# Patient Record
Sex: FEMALE | Race: BLACK OR AFRICAN AMERICAN
[De-identification: names, ages, dates, MRNs, and addresses within clinical notes are randomized per-mention and may not be internally consistent; named-entity substitution may affect disease eponyms.]

---
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
## 2020-01-24 ENCOUNTER — HOSPITAL ENCOUNTER (EMERGENCY)
Dept: HOSPITAL 62 - ER | Age: 20
Discharge: HOME | End: 2020-01-24
Payer: SELF-PAY

## 2020-01-24 VITALS — SYSTOLIC BLOOD PRESSURE: 120 MMHG | DIASTOLIC BLOOD PRESSURE: 74 MMHG

## 2020-01-24 DIAGNOSIS — S76.912A: Primary | ICD-10-CM

## 2020-01-24 DIAGNOSIS — M79.652: ICD-10-CM
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
## 2020-01-24 PROCEDURE — 99283 EMERGENCY DEPT VISIT LOW MDM: CPT

## 2020-01-24 NOTE — ER DOCUMENT REPORT
HPI





- HPI


Time Seen by Provider: 01/24/20 17:53


Onset/Duration: Persistent


Quality of pain: Achy


Pain Level: 5


Context: 





Patient presents complaining of left thigh pain for the past 3 days.  Patient 

states the day before she had been working out and since then has had soreness 

to the left anterior thigh muscle.  Patient complains of pain with ambulation.


Associated Symptoms: Other - Left thigh tenderness


Exacerbated by: Movement, Walking


Relieved by: Denies


Similar symptoms previously: No


Recently seen / treated by doctor: No





- ROS


ROS below otherwise negative: Yes


Systems Reviewed and Negative: Yes All other systems reviewed and negative





- CONSTITUTIONAL


Constitutional: DENIES: Fever





- NEURO


Neurology: DENIES: Weakness





- GASTROINTESTINAL


Gastrointestinal: DENIES: Nausea





- REPRODUCTIVE


Reproductive: DENIES: Pregnant:





- MUSCULOSKELETAL


Musculoskeletal: REPORTS: Extremity pain - left thigh.  DENIES: Swelling





- DERM


Skin Color: Normal


Skin Problems: None





Past Medical History





- General


Information source: Patient





- Social History


Smoking Status: Never Smoker


Chew tobacco use (# tins/day): No


Frequency of alcohol use: None


Drug Abuse: None


Occupation: Foodservice


Lives with: Family


Family History: Reviewed & Not Pertinent, Hypertension


Patient has suicidal ideation: No


Patient has homicidal ideation: No





- Medical History


Medical History: Negative


Renal/ Medical History: Reports: Hx Ovarian Cysts.  Denies: Hx Peritoneal 

Dialysis


Musculoskeletal Medical History: Reports Hx Musculoskeletal Trauma - upper right

leg


Skin Medical History: Reports Hx Eczema


Past Surgical History: Reports: Hx Gynecologic Surgery - Ovarian cyst removed





- Immunizations


Immunizations up to date: Yes


Hx Diphtheria, Pertussis, Tetanus Vaccination: Yes





Vertical Provider Document





- CONSTITUTIONAL


Agree With Documented VS: Yes


Exam Limitations: No Limitations


General Appearance: WD/WN, No Apparent Distress





- INFECTION CONTROL


TRAVEL OUTSIDE OF THE [place] IN LAST 30 DAYS: No





- HEENT


HEENT: Atraumatic, Normocephalic





- NECK


Neck: Normal Inspection, Supple.  negative: Lymphadenopathy-Left, 

Lymphadenopathy-Right





- RESPIRATORY


Respiratory: Breath Sounds Normal, No Respiratory Distress





- CARDIOVASCULAR


Cardiovascular: Regular Rate, Regular Rhythm





- BACK


Back: Normal Inspection





- MUSCULOSKELETAL/EXTREMETIES


Musculoskeletal/Extremeties: MAEW, Tender - Left anterior thigh tenderness over 

the rectus for Graham, soft muscle compartments, no obvious edema, no 

ecchymosis.  No tenderness to the hip or knee joints, No Edema.  negative: 

Eccymosis





- NEURO


Level of Consciousness: Awake, Alert, Appropriate


Motor/Sensory: No Motor Deficit





- DERM


Integumentary: Warm, Dry, No Rash





Course





- Re-evaluation


Re-evalutation: 





01/24/20 17:59


Patient presents with likely muscle strain to the left anterior thigh.  Patient 

without any recent traumatic injury.  No concern for any fracture or compartment

syndrome at this time.  Good return precautions discussed with patient.





- Vital Signs


Vital signs: 


                                        











Temp Pulse Resp BP Pulse Ox


 


 98.6 F   96 H  16   120/74   91 L


 


 01/24/20 17:31  01/24/20 17:31  01/24/20 17:31  01/24/20 17:31  01/24/20 17:31














Discharge





- Discharge


Clinical Impression: 


 Muscle strain





Condition: Stable


Disposition: HOME, SELF-CARE


Instructions:  Muscle Relaxers ([facility]), Muscle Strain ([facility]), Warm Packs ([facility])


Additional Instructions: 


Return immediately for any new or worsening symptoms





Followup with your primary care provider, call tomorrow to make a followup 

appointment





Weightbearing as tolerated





Follow-up with orthopedics for any persistent pain or problems


Prescriptions: 


Cyclobenzaprine HCl [Flexeril 10 Mg Tablet] 10 mg PO TID #15 tablet


Lidocaine [Lidoderm 5% (700 mg) Transdermal Patch] 1 patch TP DAILY PRN #10 

adh..patch


 PRN Reason: 


Naproxen [Naprosyn 250 Nmg Tablet] 1 tab PO BID #14 tablet


Forms:  Return to Work


Referrals: 


JACK ALBARRAN MD [Primary Care Provider] - Follow up as needed

## 2020-04-03 ENCOUNTER — HOSPITAL ENCOUNTER (EMERGENCY)
Dept: HOSPITAL 62 - ER | Age: 20
LOS: 1 days | Discharge: HOME | End: 2020-04-04
Payer: SELF-PAY

## 2020-04-03 DIAGNOSIS — K21.9: ICD-10-CM

## 2020-04-03 DIAGNOSIS — R10.13: Primary | ICD-10-CM

## 2020-04-03 LAB
ALBUMIN SERPL-MCNC: 4.3 G/DL (ref 3.7–5.6)
ALP SERPL-CCNC: 73 U/L (ref 50–135)
ANION GAP SERPL CALC-SCNC: 10 MMOL/L (ref 5–19)
APPEARANCE UR: (no result)
APTT PPP: YELLOW S
AST SERPL-CCNC: 21 U/L (ref 5–30)
BILIRUB DIRECT SERPL-MCNC: 0 MG/DL (ref 0–0.4)
BILIRUB SERPL-MCNC: 0.3 MG/DL (ref 0.2–1.3)
BILIRUB UR QL STRIP: NEGATIVE
BUN SERPL-MCNC: 8 MG/DL (ref 7–20)
CALCIUM: 10.1 MG/DL (ref 8.4–10.2)
CHLORIDE SERPL-SCNC: 106 MMOL/L (ref 98–107)
CO2 SERPL-SCNC: 25 MMOL/L (ref 22–30)
GLUCOSE SERPL-MCNC: 76 MG/DL (ref 75–110)
GLUCOSE UR STRIP-MCNC: NEGATIVE MG/DL
KETONES UR STRIP-MCNC: NEGATIVE MG/DL
NITRITE UR QL STRIP: NEGATIVE
PH UR STRIP: 7 [PH] (ref 5–9)
POTASSIUM SERPL-SCNC: 4.9 MMOL/L (ref 3.6–5)
PROT SERPL-MCNC: 7.4 G/DL (ref 6.3–8.2)
PROT UR STRIP-MCNC: NEGATIVE MG/DL
SP GR UR STRIP: 1.02
UROBILINOGEN UR-MCNC: 2 MG/DL (ref ?–2)

## 2020-04-03 PROCEDURE — 80053 COMPREHEN METABOLIC PANEL: CPT

## 2020-04-03 PROCEDURE — 85025 COMPLETE CBC W/AUTO DIFF WBC: CPT

## 2020-04-03 PROCEDURE — 36415 COLL VENOUS BLD VENIPUNCTURE: CPT

## 2020-04-03 PROCEDURE — 81025 URINE PREGNANCY TEST: CPT

## 2020-04-03 PROCEDURE — 99284 EMERGENCY DEPT VISIT MOD MDM: CPT

## 2020-04-03 PROCEDURE — 81001 URINALYSIS AUTO W/SCOPE: CPT

## 2020-04-03 PROCEDURE — 83690 ASSAY OF LIPASE: CPT

## 2020-04-04 VITALS — SYSTOLIC BLOOD PRESSURE: 118 MMHG | DIASTOLIC BLOOD PRESSURE: 72 MMHG

## 2020-04-04 LAB
ADD MANUAL DIFF: NO
BASOPHILS # BLD AUTO: 0 10^3/UL (ref 0–0.2)
BASOPHILS NFR BLD AUTO: 0.2 % (ref 0–2)
EOSINOPHIL # BLD AUTO: 0.1 10^3/UL (ref 0–0.6)
EOSINOPHIL NFR BLD AUTO: 1.1 % (ref 0–6)
ERYTHROCYTE [DISTWIDTH] IN BLOOD BY AUTOMATED COUNT: 13.8 % (ref 11.5–14)
HCT VFR BLD CALC: 39.7 % (ref 36–47)
HGB BLD-MCNC: 13.5 G/DL (ref 12–15.5)
LYMPHOCYTES # BLD AUTO: 2.5 10^3/UL (ref 0.5–4.7)
LYMPHOCYTES NFR BLD AUTO: 43 % (ref 13–45)
MCH RBC QN AUTO: 28.2 PG (ref 27–33.4)
MCHC RBC AUTO-ENTMCNC: 34 G/DL (ref 32–36)
MCV RBC AUTO: 83 FL (ref 80–97)
MONOCYTES # BLD AUTO: 0.4 10^3/UL (ref 0.1–1.4)
MONOCYTES NFR BLD AUTO: 6.5 % (ref 3–13)
NEUTROPHILS # BLD AUTO: 2.8 10^3/UL (ref 1.7–8.2)
NEUTS SEG NFR BLD AUTO: 49.2 % (ref 42–78)
PLATELET # BLD: 305 10^3/UL (ref 150–450)
RBC # BLD AUTO: 4.79 10^6/UL (ref 3.72–5.28)
TOTAL CELLS COUNTED % (AUTO): 100 %
WBC # BLD AUTO: 5.8 10^3/UL (ref 4–10.5)

## 2020-06-12 ENCOUNTER — HOSPITAL ENCOUNTER (OUTPATIENT)
Dept: HOSPITAL 62 - RAD | Age: 20
End: 2020-06-12
Attending: PHYSICIAN ASSISTANT
Payer: MEDICAID

## 2020-06-12 DIAGNOSIS — M79.661: Primary | ICD-10-CM

## 2020-06-12 DIAGNOSIS — R22.41: ICD-10-CM

## 2020-06-12 NOTE — RADIOLOGY REPORT (SQ)
EXAM DESCRIPTION:  MRI RT LOWER EXTREMITY WITHOUT



IMAGES COMPLETED DATE/TIME:  6/12/2020 11:56 am



REASON FOR STUDY:  M79.661 PAIN IN RIGHT LOWER LEG M79.661  PAIN IN RIGHT LOWER LEG R22.41  LOCALIZED
 SWELLING, MASS AND LUMP, RIGHT LOWER LIMB  calf pain, swelling, tingling, numbness.  Pain in the ant
erior right lower leg.  Lost feeling in leg.  Marker was placed on the area of pain.



COMPARISON:  None.



TECHNIQUE:  Multiplanar imaging to include fat and fluid sensitive sequences.



RENAL FUNCTION:  NA



LIMITATIONS:  None.



FINDINGS:  There is no mass identified in the area of concern.  Underlying skin and subcutaneous soft
 tissue is normal.  Underlying muscle has normal signal and appearance.  Normal flow voids in the adj
acent vasculature.  There is normal bone marrow signal.  No knee joint effusion.  Normal appearance o
f the ligaments and tendons.



IMPRESSION:  No abnormality in the area of palpable concern.



TECHNICAL DOCUMENTATION:  JOB ID:  [id number]

 2011 [email or web address]- All Rights Reserved



Reading location - IP/workstation name: [email or web address]

## 2020-06-26 ENCOUNTER — HOSPITAL ENCOUNTER (OUTPATIENT)
Dept: HOSPITAL 62 - RAD | Age: 20
End: 2020-06-26
Attending: NURSE PRACTITIONER
Payer: MEDICAID

## 2020-06-26 DIAGNOSIS — R10.11: Primary | ICD-10-CM

## 2020-06-26 PROCEDURE — 76705 ECHO EXAM OF ABDOMEN: CPT

## 2020-06-26 NOTE — RADIOLOGY REPORT (SQ)
EXAM DESCRIPTION:  U/S ABDOMEN LIMITED W/O DOP



IMAGES COMPLETED DATE/TIME:  6/26/2020 8:25 am



REASON FOR STUDY:  RUQ PAIN R10.11  RIGHT UPPER QUADRANT PAIN



COMPARISON:  10/5/2015



TECHNIQUE:  Dynamic and static grayscale images acquired of the abdomen and recorded on PACS. Additio
nal selected color Doppler and spectral images recorded.



LIMITATIONS:  None.



FINDINGS:  PANCREAS: No masses.  Visualized pancreatic duct normal caliber.

LIVER: No focal lesions.  Normal size measuring 16.7 cm.  Increased hepatic echogenicity with decreas
ed visualization of the portal triads.

LIVER VASCULATURE: Normal directional flow of the main portal vein and hepatic veins.

GALLBLADDER: No stones. Normal wall thickness. No pericholecystic fluid.

ULTRASOUND-DETECTED STARKS'S SIGN: Negative.

INTRAHEPATIC DUCTS AND COMMON DUCT: CBD and intrahepatic ducts normal caliber. No filling defects.

INFERIOR VENA CAVA: Normal flow.

AORTA: No aneurysm.

RIGHT KIDNEY:  Normal size measuring 9.4 cm. Normal echogenicity. No solid or suspicious masses. No h
ydronephrosis. No calcifications.

PERITONEAL AND RIGHT PLEURAL SPACE: No ascites or effusions.

OTHER: No other significant findings.



IMPRESSION:  Increased hepatic echogenicity suggestive of hepatic steatosis.  Otherwise, unremarkable
 right upper quadrant ultrasound.



TECHNICAL DOCUMENTATION:  JOB ID:  [id number]

 2011 [employer]- All Rights Reserved



Reading location - IP/workstation name: [email or web address]

## 2020-12-20 ENCOUNTER — HOSPITAL ENCOUNTER (EMERGENCY)
Dept: HOSPITAL 62 - ER | Age: 20
Discharge: HOME | End: 2020-12-20
Payer: MEDICAID

## 2020-12-20 VITALS — DIASTOLIC BLOOD PRESSURE: 70 MMHG | SYSTOLIC BLOOD PRESSURE: 132 MMHG

## 2020-12-20 DIAGNOSIS — R07.9: ICD-10-CM

## 2020-12-20 DIAGNOSIS — F12.10: ICD-10-CM

## 2020-12-20 DIAGNOSIS — Z79.899: ICD-10-CM

## 2020-12-20 DIAGNOSIS — M54.12: Primary | ICD-10-CM

## 2020-12-20 DIAGNOSIS — M25.512: ICD-10-CM

## 2020-12-20 DIAGNOSIS — F17.200: ICD-10-CM

## 2020-12-20 DIAGNOSIS — M79.602: ICD-10-CM

## 2020-12-20 LAB
ADD MANUAL DIFF: NO
ALBUMIN SERPL-MCNC: 4.3 G/DL (ref 3.5–5)
ALP SERPL-CCNC: 78 U/L (ref 38–126)
ANION GAP SERPL CALC-SCNC: 7 MMOL/L (ref 5–19)
AST SERPL-CCNC: 21 U/L (ref 14–36)
BASOPHILS # BLD AUTO: 0 10^3/UL (ref 0–0.2)
BASOPHILS NFR BLD AUTO: 0.1 % (ref 0–2)
BILIRUB DIRECT SERPL-MCNC: 0.1 MG/DL (ref 0–0.4)
BILIRUB SERPL-MCNC: 0.5 MG/DL (ref 0.2–1.3)
BUN SERPL-MCNC: 12 MG/DL (ref 7–20)
CALCIUM: 9.9 MG/DL (ref 8.4–10.2)
CHLORIDE SERPL-SCNC: 102 MMOL/L (ref 98–107)
CO2 SERPL-SCNC: 28 MMOL/L (ref 22–30)
EOSINOPHIL # BLD AUTO: 0.1 10^3/UL (ref 0–0.6)
EOSINOPHIL NFR BLD AUTO: 1.2 % (ref 0–6)
ERYTHROCYTE [DISTWIDTH] IN BLOOD BY AUTOMATED COUNT: 13.5 % (ref 11.5–14)
GLUCOSE SERPL-MCNC: 85 MG/DL (ref 75–110)
HCT VFR BLD CALC: 41.3 % (ref 36–47)
HGB BLD-MCNC: 13.6 G/DL (ref 12–15.5)
LYMPHOCYTES # BLD AUTO: 2.6 10^3/UL (ref 0.5–4.7)
LYMPHOCYTES NFR BLD AUTO: 38.3 % (ref 13–45)
MCH RBC QN AUTO: 27.6 PG (ref 27–33.4)
MCHC RBC AUTO-ENTMCNC: 32.9 G/DL (ref 32–36)
MCV RBC AUTO: 84 FL (ref 80–97)
MONOCYTES # BLD AUTO: 0.4 10^3/UL (ref 0.1–1.4)
MONOCYTES NFR BLD AUTO: 5.8 % (ref 3–13)
NEUTROPHILS # BLD AUTO: 3.7 10^3/UL (ref 1.7–8.2)
NEUTS SEG NFR BLD AUTO: 54.6 % (ref 42–78)
PLATELET # BLD: 335 10^3/UL (ref 150–450)
POTASSIUM SERPL-SCNC: 4.3 MMOL/L (ref 3.6–5)
PROT SERPL-MCNC: 7.7 G/DL (ref 6.3–8.2)
RBC # BLD AUTO: 4.94 10^6/UL (ref 3.72–5.28)
TOTAL CELLS COUNTED % (AUTO): 100 %
WBC # BLD AUTO: 6.8 10^3/UL (ref 4–10.5)

## 2020-12-20 PROCEDURE — 36415 COLL VENOUS BLD VENIPUNCTURE: CPT

## 2020-12-20 PROCEDURE — 85025 COMPLETE CBC W/AUTO DIFF WBC: CPT

## 2020-12-20 PROCEDURE — 72050 X-RAY EXAM NECK SPINE 4/5VWS: CPT

## 2020-12-20 PROCEDURE — 84484 ASSAY OF TROPONIN QUANT: CPT

## 2020-12-20 PROCEDURE — 99285 EMERGENCY DEPT VISIT HI MDM: CPT

## 2020-12-20 PROCEDURE — 93010 ELECTROCARDIOGRAM REPORT: CPT

## 2020-12-20 PROCEDURE — 80053 COMPREHEN METABOLIC PANEL: CPT

## 2020-12-20 PROCEDURE — 93005 ELECTROCARDIOGRAM TRACING: CPT

## 2020-12-20 PROCEDURE — 71046 X-RAY EXAM CHEST 2 VIEWS: CPT

## 2020-12-20 NOTE — ER DOCUMENT REPORT
ED General





- General


Chief Complaint: Chest Pain


Stated Complaint: ARM PAIN


Time Seen by Provider: 20 16:41


Primary Care Provider: 


PRISCILLA BRENNER NP [Primary Care Provider] - Follow up as needed


TRAVEL OUTSIDE OF THE [place] IN LAST 30 DAYS: No





- HPI


Context: 





Time:





Chief Complaint: Chest and arm pain





[This is a 20-year-old left-hand-dominant female presenting to the emergency 

department complaining of left-sided chest, shoulder and left upper extremity 

pain that she says has been going on for about a week.  Patient denies trauma or

 strenuous activity.  Patient states that she has a constant sharp pain that is 

in her chest and in her shoulder and it radiates down into her left upper 

extremity all the way to her third through fifth fingers.  Patient states she h

as tried over-the-counter medications such as Tylenol and Motrin without relief 

of symptoms.         ]





History obtained from [patient]


Symptoms began:[1 week ago]


Onset: [Gradual]


Timing: [Gradual]


Quality: [Sharp]


Intensity: [3]





Location: [Left chest, left shoulder and left upper extremity]


Radiation: [Radiates from chest into left upper extremity]








Aggravating factors: Movement of left upper extremity


Relieving factors: [none]





[Denies] SOB


[Denies] nausea


[Denies] vomiting


[Denies] sweats


[Denies] fever


[Denies] cough


[Denies] calf or leg swelling or pain











- Related Data


Allergies/Adverse Reactions: 


                                        





No Known Allergies Allergy (Verified 20 16:43)


   











Past Medical History





- General


Information source: Patient





- Social History


Smoking Status: Current Some Day Smoker


Drug Abuse: Marijuana


Family History: Reviewed & Not Pertinent, Hypertension


Renal/ Medical History: Reports: Hx Ovarian Cysts.  Denies: Hx Peritoneal 

Dialysis


Musculoskeletal Medical History: Reports Hx Musculoskeletal Trauma - upper right

 leg


Skin Medical History: Reports Hx Eczema


Past Surgical History: Reports: Hx Gynecologic Surgery - Ovarian cyst removed





- Immunizations


Immunizations up to date: Yes


Hx Diphtheria, Pertussis, Tetanus Vaccination: Yes





Review of Systems





- Review of Systems


Notes: 





Review of systems as below unless otherwise stated in HPI.


CONSTITUTIONAL 


[No] fever, [No] chills.


EYES 


[No] eye pain. 


ENT 


[No] URI symptoms, [No] sore throat, [No] ear pain.


CARDIOVASCULAR 


Positive chest pain, [No] palpitations, [No] edema.


RESPIRATORY 


[No] Cough, [No] SOB, [No] wheezing. 


GASTROINTESTINAL 


[No] abdominal pain, [No] nausea, [No] Diarrhea, [No] Vomiting, [No] 

constipation, [No] melena, [No] rectal bleeding.


GENITOURINARY 


[No] dysuria, [No] urinary frequency, [No] hematuria, [No] urinary urgency, [No]

 vaginal discharge, [No] vaginal bleeding.


MUSCULOSKELETAL 


[No] Back pain.  Positive left upper extremity pain


SKIN 


[No] Rash.


NEUROLOGIC 


[No] Headache, [No] recent seizures, [No] paralysis,[ positive] parathesias in 

left third through fifth fingers


ENDOCRINE 


[No] polyuria. 


HEMO/LYMPATIC 


[No] easy brusing


PSYCHIATRIC 


[No] depression.














Physical Exam





- Vital signs


Vitals: 


                                        











Temp Pulse Resp BP Pulse Ox


 


 99.1 F   76   16   132/70 H  100 


 


 20 16:50  20 16:50  20 16:50  20 16:50  20 16:50














- Notes


Notes: 








CONSTITUTIONAL 


[Vital signs reviewed, Patient appears comfortable, Alert and oriented X 3, 

Normal stature.]


HEAD 


[Atraumatic, Normocephalic.]


EYES 


[Eyes are normal to inspection, No discharge from eyes, Extraocular muscles 

intact, Sclera are normal, Conjunctiva are normal.]


ENT 


[External ears normal to inspection, Nose examination normal, Mouth normal to 

inspection.]


NECK 


[Normal ROM, No jugular venous distention, No meningeal signs, ]


RESPIRATORY CHEST 


[Chest is nontender, Breath sounds normal, No respiratory distress.]


CARDIOVASCULAR 


[RRR, No murmurs, Normal S1 S2, No rub, No gallop.]


ABDOMEN 


[Abdomen is nontender, No pulsatile masses, No other masses, Bowel sounds no

rmal, No distension, No peritoneal signs, No hernias.]


BACK 


[There is no CVA Tenderness, There is no tenderness to palpation, Normal 

inspection.]


UPPER EXTREMITY 


 No cyanosis, No clubbing, No edema, 


LOWER EXTREMITY 


[Inspection normal, No cyanosis, No clubbing, No edema, No calf tenderness,


NEURO 


[No focal motor deficits, patient has decreased sensation and her third through 

fifth fingertips, speech normal.]


SKIN 


[Skin is warm, Skin is dry, Skin is normal color.]


LYMPHATIC 


[No adenopathy in neck.]


PSYCHIATRIC 


[Normal affect. ]





Course





- Re-evaluation


Re-evalutation: 





20 21:02


Results of ED MSE discussed with patient.  All questions were answered prior to 

discharge.  Emergency signs and symptoms, reasons to return to the emergency 

department discussed with patient.





- Vital Signs


Vital signs: 


                                        











Temp Pulse Resp BP Pulse Ox


 


 99.1 F   76   16   132/70 H  100 


 


 20 16:50  20 16:50  20 16:50  20 16:50  20 16:50














- Laboratory Results


Result Diagrams: 


                                 20 17:32





                                 20 17:32


Laboratory Results Interpreted: 


                                        











  20





  17:32


 


Sodium  136.9 L











Critical Laboratory Results Reviewed: No Critical Results


Attending or Supervising Physician who Reviewed Labs: GEORGE ROBLERO IV





- Radiology Results


Critical Radiology Results Reviewed: No Critical Results


Attending or Supervising Physician who Reviewed Radiology: GEORGE ROBLERO IV





- EKG Interpretation by Me


Additional EKG results interpreted by me: 





20 21:04


EKG obtained on 2020 at 1656 hrs. was interpreted by this MD.  Findings 

normal sinus rhythm, rate 81, normal axis, [place] interval is within normal limits, 

P waves preceding QRS complexes, QRS complexes appear narrow, QTC is 432, there 

are no obvious patterns of ST segment elevation, depression or reciprocal change

noted to suggest acute myocardial ischemia or infarction.  When compared with 

prior EKG from 2018, the gross morphology of the 2 EKGs appears unchanged.  

Impression: Normal sinus rhythm with nonspecific ST segments.





Procedures





- Immobilization


  ** Left Upper Arm


Time completed: 21:30


Pre-Proc Neuro Vasc Exam: Abnormal


Immobilizer type: Shoulder immobilizer


Performed by: PCT


Post-Proc Neuro Vasc Exam: Unchanged from pre-exam


Alignment checked and good: Yes





Discharge





- Discharge


Clinical Impression: 


 Cervical radiculopathy





Condition: Stable


Disposition: HOME, SELF-CARE


Additional Instructions: 


Return to the Emergency Department without delay if any worse.





Wear shoulder immobilizer at all times except when showering for the next 5 to 7

days in order to obtain relief of symptoms.











HOME CARE INSTRUCTIONS & INFORMATION:  Thank you for choosing us for your 

medical needs. We hope you're satisfied with the care you received.  After you 

leave, you must properly care for your problem and, at the same time, observe 

its progress.  Any condition can change.  Some illnesses can change rapidly over

hours or days.  If your condition worsens, return to the Emergency Department or

see your physician promptly.





ABOUT YOUR X-RAYS AND EKG'S:   If you had an EKG or X-rays taken, they have been

read by the Emergency Physician. The X-rays and EKG's will also be read by a 

Radiologist or Cardiologist within 24 hours.  If discrepancies are noted, you 

will be notified by telephone.  Please be certain the ED has a correct telephone

number & address where you can be reached.  Also, realize that some fractures or

abnormalities do not show up on initial X-rays.  If your symptoms continue, see 

your physician.





ABOUT YOUR LABORATORY TEST:   If you had laboratory tests, the results have been

reviewed by the Emergency Physician.  Some test results (for example cultures) 

may not be available for several days.  You will be contacted if any test result

shows you need additional treatment.  Please be certain the ED has a correct 

telephone number and address where you can be reached.





ABOUT YOUR MEDICATIONS:  You will receive instructions on how to take your 

medicine on the prescription label you receive.  Additional information may be 

provided by the Pharmacy.  If you have questions afterwards, call the ED for 

clarification or further instructions.  Some prescribed medications may cause 

drowsiness.  Do not perform tasks such as driving a car or operating machinery 

without consulting your Pharmacist.  If you feel you need a refill of pain 

medication, your condition will need re-evaluation.  Please do not call for a 

refill of any medication.





ABOUT YOUR SIGNATURE:   Signature of this document acknowledges to followin. Understanding that you received emergency treatment and that you may be 

   released before al medical problems are known or treated. Please be certain  

   the ED has a correct phone number & address where you can be reached.


   2. Acknowledgement that you will arrange for follow-up care as recommended.


   3. Authorization for the Emergency Physician to provide information to your 

follow-up Physician in order to maximize your care.





AT ANY TIME, IF YOUR SYMPTOMS CHANGE SIGNIFICANTLY OR WORSEN OR YOU DEVELOP NEW 

SYMPTOMS, RETURN TO THE EMERGENCY DEPARTMENT IMMEDIATELY FOR RE-EVALUATION.





OUR GOAL IS TO PROVIDE EXCELLENT MEDICAL CARE!





WE HOPE THAT WE HAVE MET YOUR EXPECTATIONS DURING YOUR EMERGENCY DEPARTMENT 

VISIT AND THAT YOU FEEL YOU HAVE RECEIVED EXCELLENT CARE!








Radiculopathy





     Radiculopathy is irritation of a nerve.  Sometimes this is called "pinched 

nerve."  The pain can be sharp and stabbing, constant and dull, or burning in 

nature.  The pain can occur in any area of the chest, shoulders, or arms.  

Sometimes the pain is provoked by coughing or moving.


     Radiculopathy can be caused by physical pressure on a nerve, such as a 

herniated disc or swollen joint in the spine. It can also be caused by viral 

infections within the nerve or by nerve damage due to diabetes or blood vessel 

disease.


     Radicular pain is treated with antiinflammatory medicine. Injections may 

help resistant cases, if we can identify a single nerve that's causing the pain.

Surgery is usually not necessary. If symptoms do not improve with time, you may 

need additional testing, such as an MRI or EMG (electromyogram).


     Return if there is local weakness or numbness, shortness of breath, 

increasing pain, or other new symptoms.





Prescriptions: 


Cyclobenzaprine HCl [Flexeril 10 mg Tablet] 10 mg PO Q8HP PRN #21 tab


 PRN Reason: muscle tightness


Forms:  Return to Work


Referrals: 


PRISCILLA BRENNER NP [Primary Care Provider] - Follow up as needed

## 2020-12-20 NOTE — RADIOLOGY REPORT (SQ)
EXAM DESCRIPTION:  CHEST 2 VIEWS



IMAGES COMPLETED DATE/TIME:  12/20/2020 2:24 pm



REASON FOR STUDY:  L CP, upper back pain



COMPARISON:  None.



EXAM PARAMETERS:  NUMBER OF VIEWS: two views

TECHNIQUE: Digital Frontal and Lateral radiographic views of the chest acquired.

RADIATION DOSE: NA

LIMITATIONS: none



FINDINGS:  LUNGS AND PLEURA: No opacities, masses or pneumothorax. No pleural effusion.

MEDIASTINUM AND HILAR STRUCTURES: No masses or contour abnormalities.

HEART AND VASCULAR STRUCTURES: Heart normal size.  No evidence for failure.

BONES: No acute findings.

HARDWARE: None in the chest.

OTHER: No other significant finding.



IMPRESSION:  NO ACUTE RADIOGRAPHIC FINDING IN THE CHEST.



TECHNICAL DOCUMENTATION:  JOB ID:  [id number]

 2011 [employer]- All Rights Reserved



Reading location - IP/workstation name: [email or web address]

## 2020-12-20 NOTE — EKG REPORT
SEVERITY:- BORDERLINE ECG -

SINUS RHYTHM

PROBABLE LEFT ATRIAL ABNORMALITY

:

Confirmed by: Chrystal Spears MD 20-Dec-2020 20:41:49

## 2020-12-20 NOTE — ER DOCUMENT REPORT
ED Medical Screen (RME)





- General


Chief Complaint: Chest Pain


Stated Complaint: ARM PAIN


Time Seen by Provider: 12/20/20 16:41


Primary Care Provider: 


PRISCILLA BRENNER NP [Primary Care Provider] - Follow up as needed


Notes: 





Patient presents complaining of left-sided chest pain that radiates to left 

upper back and left upper extremity for the past 3 weeks.  Patient denies any 

shortness of breath.  Patient does report occasional cough.  Patient denies any 

nausea or vomiting.


TRAVEL OUTSIDE OF THE [place] IN LAST 30 DAYS: No





- Related Data


Allergies/Adverse Reactions: 


                                        





No Known Allergies Allergy (Verified 12/20/20 16:43)


   











Past Medical History





- Social History


Drug Abuse: Marijuana


Renal/ Medical History: Reports: Hx Ovarian Cysts.  Denies: Hx Peritoneal 

Dialysis


Musculoskeltal Medical History: Reports Hx Musculoskeletal Trauma - upper right 

leg


Skin Medical History: Reports Hx Eczema


Past Surgical History: Reports: Hx Gynecologic Surgery - Ovarian cyst removed





- Immunizations


Immunizations up to date: Yes


Hx Diphtheria, Pertussis, Tetanus Vaccination: Yes





Physical Exam





- General


General appearance: Appears well, Alert


Notes: 





Left upper anterior chest tenderness, left upper back tenderness





Doctor's Discharge





- Discharge


Referrals: 


PRISCILLA BRENNER NP [Primary Care Provider] - Follow up as needed

## 2020-12-20 NOTE — RADIOLOGY REPORT (SQ)
EXAM DESCRIPTION:  CERV SP 4 OR 5 VIEWS



IMAGES COMPLETED DATE/TIME:  12/20/2020 5:24 pm



REASON FOR STUDY:  upper back, LUE pain



COMPARISON:  None.



NUMBER OF VIEWS:  Five views.



TECHNIQUE:  AP, lateral, obliques and odontoid radiographic images acquired of the cervical spine.



LIMITATIONS:  None.



FINDINGS:  MINERALIZATION: Normal.

ALIGNMENT: Anatomic.

VERTEBRAE: Vertebral bodies of normal height.

DISCS: No significant osteophytes or sclerosis. Disc height maintained.

FORAMINA: No osteophytes or foraminal narrowing.

LATERAL AND POSTERIOR ELEMENTS: Facets, lateral masses and spinous processes without significant find
ings.

HARDWARE: None in the spine.

SOFT TISSUES: No masses or calcifications. Lung apices clear.

OTHER: No other significant finding.



IMPRESSION:  NO SIGNIFICANT RADIOGRAPHIC FINDING IN THE CERVICAL SPINE.



TECHNICAL DOCUMENTATION:  JOB ID:  [id number]

 2011 [employer]- All Rights Reserved



Reading location - IP/workstation name: [email or web address]